# Patient Record
(demographics unavailable — no encounter records)

---

## 2024-10-22 NOTE — CONSULT LETTER
[Dear  ___] : Dear  [unfilled], [Consult Letter:] : I had the pleasure of evaluating your patient, [unfilled]. [Please see my note below.] : Please see my note below. [Consult Closing:] : Thank you very much for allowing me to participate in the care of this patient.  If you have any questions, please do not hesitate to contact me. [Sincerely,] : Sincerely, [FreeTextEntry2] : Dr. Harlan Carrillo [FreeTextEntry3] : Evans Wong MD, PhD Chief, Division of Laryngology Department of Otolaryngology [facility] Pediatric Otolaryngology, [facility]  of Otolaryngology [facility]

## 2024-10-22 NOTE — HISTORY OF PRESENT ILLNESS
[de-identified] : 74 year old male presents for follow up evaluation of right parotid sweating. Patient reports 15 year history of right parotid mass. States grew in size within the past few months. 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 Now s/p Resection of right parotid mass with facial nerve monitoring on 8/26/2024. Last Botox injection 10/2/24 Reports still right parotid sweating / clear fluid leaking from the incision site. Occurs while eating / drinking / salivating. No associated pain or swelling. No fever or chills. Denies dysphagia, dyspnea and dysphonia.

## 2024-10-22 NOTE — CONSULT LETTER
[Dear  ___] : Dear  [unfilled], [Consult Letter:] : I had the pleasure of evaluating your patient, [unfilled]. [Please see my note below.] : Please see my note below. [Consult Closing:] : Thank you very much for allowing me to participate in the care of this patient.  If you have any questions, please do not hesitate to contact me. [Sincerely,] : Sincerely, [FreeTextEntry2] : Dr. Harlan Carrillo [FreeTextEntry3] : Evans Wong MD, PhD Chief, Division of Laryngology Department of Otolaryngology [facility] Pediatric Otolaryngology, [facility]  of Otolaryngology [facility] [employer] [facility]

## 2024-10-22 NOTE — HISTORY OF PRESENT ILLNESS
[de-identified] : 74 year old male presents for follow up evaluation of right parotid sweating. Patient reports 15 year history of right parotid mass. States grew in size within the past few months. 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 Now s/p Resection of right parotid mass with facial nerve monitoring on 8/26/2024. Last Botox injection 10/2/24 Reports still right parotid sweating / clear fluid leaking from the incision site. Occurs while eating / drinking / salivating. No associated pain or swelling. No fever or chills. Denies dysphagia, dyspnea and dysphonia.

## 2025-01-30 NOTE — REASON FOR VISIT
[Subsequent Evaluation] : a subsequent evaluation for [FreeTextEntry2] : s/p Resection of right parotid mass with facial nerve monitoring on 8/26/2024

## 2025-01-30 NOTE — PHYSICAL EXAM
[Midline] : trachea located in midline position [Normal] : no rashes [de-identified] : Presence of mild induration near the ear lobe with signs of a fistula.

## 2025-01-30 NOTE — CONSULT LETTER
[Dear  ___] : Dear  [unfilled], [Consult Letter:] : I had the pleasure of evaluating your patient, [unfilled]. [Please see my note below.] : Please see my note below. [Consult Closing:] : Thank you very much for allowing me to participate in the care of this patient.  If you have any questions, please do not hesitate to contact me. [Sincerely,] : Sincerely, [FreeTextEntry2] : Dr Ashley [FreeTextEntry3] :  Harlan Carrillo MD, FACS  Otolaryngology-Head and Neck Surgery [employer] [facility]

## 2025-01-30 NOTE — PLAN
[TextEntry] : - R parotid nodule for 15 years. Feels like it has grown recently. - US in the office showed a 1.2 cm hypoechoic nodule with some posterior lobulation and posterior enhancement suggestive of a parotid neoplasm. - S/P extracapsular resection on 8/26/24. Had Botox injection with improvement of the symptoms. - NEETA on palpation or US in the office today. - Return in 6 months.

## 2025-01-30 NOTE — HISTORY OF PRESENT ILLNESS
[de-identified] : 74 yro pt referred by Dr. Ashley for eval of neck mass. Pt first noticed mass near the R ear about 15 years ago, has remained stable in size, but got bigger in the last few months. 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  Patient is now s/p Resection of right parotid mass with facial nerve monitoring on 8/26/2024. Presents today for follow-up s/o Botox injections with Dr. Wong 10/2/2024 and 10/16/2024   Patient reports the leakage has stopped since having the Botox injections and has been feeling well.  Reports residual numbness is at the site of the surgery and some residual scar tissue at the site of the incision. Patient denies throat/neck pain, globus sensation, dysphagia, odynophagia, dyspnea, dysphonia, hemoptysis or otalgia. Denies recent fevers/infections, chills, night sweats, unintentional weight loss.   Path 8/26/2024: Specimen(s) Submitted 1- Right parotid tumor  Final Diagnosis 1. Parotid, right parotid tumor, resection - Pleomorphic adenoma

## 2025-02-24 NOTE — PHYSICAL EXAM
[Abdominal Masses] : No abdominal masses [No HSM] : no hepatosplenomegaly [No Rash or Lesion] : No rash or lesion [Calm] : calm [de-identified] : ambulating NAD [de-identified] : wnl [de-identified] : Soft nontender nondistended healed surgical scar floor intact [de-identified] : FROM [de-identified] : CN 2-12 GI

## 2025-02-24 NOTE — END OF VISIT
[Time Spent: ___ minutes] : I have spent [unfilled] minutes of time on the encounter which excludes teaching and separately reported services. -3

## 2025-02-24 NOTE — HISTORY OF PRESENT ILLNESS
[de-identified] : Dom is a 74 y/o male here for a follow up. S/p 09/05/2024- Repair of umbilical hernia with medium PVP mesh hernia approximately 3 to 4 cm.  Today pt reports no pain. Normal BMs, denies constipation or diarrhea. Denies nausea and vomiting. Good appetite. Does see a bulge of abdomen since sx, denies increase in size of bulge, denies hearing gurgling noises, and denies discomfort from area.

## 2025-02-24 NOTE — ASSESSMENT
[FreeTextEntry1] : 75-year-old male here to evaluate what he feels is slightly bulging mid abdomen.  He has no pain he is working out and doing core exercises I have evaluated him he has no hernias the floor is intact this is just body habitus slightly changed after surgery reassurance given all of his questions were answered

## 2025-04-22 NOTE — HISTORY OF PRESENT ILLNESS
[FreeTextEntry1] :  Dom Kirby returned to the office having been last seen on 2024.  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  Several months ago, he noted pain in his right shoulder radiating to his medial arm and forearm associated with paresthesias.  Sometime later, he experienced similar symptoms on the left side.  He had only mild neck discomfort and crepitance.  He denied limb weakness, lower extremity numbness or sphincteric difficulties.  His gait was unaffected.  He denied cognitive, visual, hearing, swallowing, motor, gait or sphincteric difficulties.  MRI of the cervical spine revealed multilevel bilateral foraminal stenosis.  There was increased T2 signal within the dorsal columns from C3-T1.  There was no abnormal enhancement.  These findings raise suspicion of vitamin B12 deficiency.  In ,  Anthonymbnorberto underwent laryngeal procedure because of hoarseness.  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  He underwent serial CAT scans for several years and determinations of urine and serum immunoglobulins.  These were normal.  In , he again developed hoarseness and underwent a repeat CT of the neck and electrophoresis studies which were unremarkable.  He declined a follow-up physical examination at his 2023 visit.  He had a long history of nonradiating low back pain and more recent upper extremity pain and tingling.  His symptoms seem most consistent with cervical root compression.  I was uncertain to what extent his posterior column findings were contributing to his presentation.  He underwent extensive serologies.  TTR was negative.  He underwent updated imaging in 2023.  MRA of the neck and brain were unremarkable.  MRI of the cervical spine revealed T2 hyperintense lesion in the dorsal cord from C3-T1 without enhancement.  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  He did not agree to CSF analysis or updated imaging.  Since last seen, he was evaluated by neurosurgeon Dr. Zack Mirza and a physiatrist/pain management physician Dr. Lico Calix.  Various pain management options were discussed including epidural steroid injections.  At his 2024 visit, he reports fluctuating left-sided neck discomfort with left greater than right upper extremity pain with paresthesias in the ulnar distribution and medial forearms.  He denied weakness or involvement of his lower extremities.  He underwent EMG and nerve conduction studies performed by a neurologist Dr. Star Franklin.  Those results were not available.  He complained of right lateral epicondyles pain.  He reports for the most part that his paresthesias have resolved.  He has occasional left shoulder and arm discomfort.  He denies cognitive, visual, hearing, bulbar, gait, lower extremity or sphincteric difficulties.  He underwent updated MRIs of the cervical and thoracic cord a week ago.  To my review, there appeared to be stable T2 hyperintense signal in the dorsal cord from C3-T1.  Past surgical history is notable for bilateral inguinal herniorrhaphies.  He suffers from hypertension and genital herpes.  There is no history of diabetes, hyperlipidemia, cardiac, pulmonary, renal, hepatic, gastrointestinal, thyroid, hematologic or cerebrovascular disease.  He has no allergies.  Medications include baby aspirin, atorvastatin 10 mg/day, valacyclovir and losartan 50 mg daily.  He is a former smoker and nondrinker.  He is  and has 5 children.  1 son has a long history of low back pain.  Family history is notable for a father with heart disease.  His mother  in her sleep at age 98.

## 2025-04-22 NOTE — PHYSICAL EXAM
[FreeTextEntry1] : Constitutional:  Patient was well-developed, well-nourished and in no acute distress.   Head:  Normocephalic, atraumatic. Tympanic membranes were not examined.   Neck:  Supple with full range of motion.   Cardiovascular:  Cardiac rhythm was regular without murmur. There were no carotid bruits. Peripheral pulses were full and symmetric.   Respiratory:  Lungs were clear.   Abdomen:  Soft and nontender.   Spine:  Nontender.   Skin:  There were no rashes.   NEUROLOGICAL EXAMINATION:  Mental Status: Patient was alert and oriented. Speech was fluent. There was no dysarthria.   Cranial Nerves:   II: He could finger count bilaterally. Pupils were equal and reactive. Visual fields were full.  Fundi were not examined.  III, IV, VI:  Eye movements were full without nystagmus.   V: Facial sensation was intact.   VII: Facial strength was normal.   VIII: Hearing was equal.   IX, X: Palatal movement was normal. Phonation was normal.   XI: Sternocleidomastoids and trapezii were normal.   XII: Tongue was midline and movements normal. There was no lingual atrophy or fasciculations.   Motor Examination: Muscle bulk, tone and strength were normal.  Fine finger movements were normal.  Sensory Examination: Pinprick, vibration and joint position sense were intact.   Reflexes: DTRs were absent in the upper extremities, 2 at the knees and left ankle and absent at the right ankle.  Plantar Responses: Plantar responses were flexor.   Coordination/Cerebellar Function: There was no dysmetria on finger to nose or heel to shin testing.   Gait/Stance: Gait and tandem were normal. Romberg was negative.

## 2025-04-22 NOTE — ASSESSMENT
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  His symptoms which are predominately upper extremity have improved significantly particularly his paresthesias.  Assuming stability of his recent cervical and thoracic MRIs, I suspect that the abnormalities represent syringomyelia.  In the absence of any clinical or radiographic progression, cautious observation is indicated.  I suggested a routine follow-up visit in 1 year.  Telephone contact will be maintained in the meantime.